# Patient Record
(demographics unavailable — no encounter records)

---

## 2025-03-18 NOTE — DATA REVIEWED
[FreeTextEntry1] : office xrays of right hip ap and lateral show mild progression of lateral hip oa and mixed type hip impingement.

## 2025-03-18 NOTE — PHYSICAL EXAM
[FreeTextEntry1] : CAMDEN is a 74 year old female  Constitutional: healthy appearing, NAD, and normal body habitus  LUMBAR  ROM: flexion to 30 deg, ext to 5 deg  Gait: normal  Inspection: no erythema, warmth Spine: no TTP in spinous process Bony palpation: no TTP in GT  Soft tissue palpation hip: no TTP in gluteus pierre Soft tissue palpation of spine:  TTP in lumbar paraspinals   5/5 bilateral KE, DF, PF and right HF sensation intact in bilat LE  Special tests: neg seated SLR +FADIR in right hip  shoulders: abd to 180 deg bilaterally

## 2025-03-18 NOTE — ASSESSMENT
[FreeTextEntry1] : lumbar - Discussed diagnosis and treatment plan including PT. Not taking chinese herbs  Ice area often. Told to be careful with NSAIDs given age. Watch out for GI bleeding, blood in stool, and stomach irritation.  shoulder - Do shoulder stretches often get back to RTC exercises  hip - has mod/severe hip OA consider hip injection avoid using high bath tub   f/u 4 wk

## 2025-03-18 NOTE — HISTORY OF PRESENT ILLNESS
[FreeTextEntry1] : Location: back Severity: 5/10 worse after moving boxes from low cabinet Duration: few months; hx LBP 2 years ago Context: atraumatic Aggravating Factors: sitting, stairs for hip Alleviating Factors: injections Associated Symptoms: denies fever, chills, change in bowel/bladder habits, weakness, numbness/tingling, + radiation down right leg, denies weight loss.  Right hip hurting after getting into high bath tub.   Prior Studies: xray; MRI 2022 9/2022 right hip injection - 30% relief 11/2022 right L2 and S1 RITU  Shoulders are doing well. Pain is 2/10. Pain with sleeping on shoulder.  Chi jessie helps. 10/2022 right subacromial injection 4/2023 right subacromial injection 11/2023 Left C4/5 and C5/6 Facet Injections

## 2025-03-18 NOTE — PROCEDURE
[de-identified] : Indication: myofascial pain   After informed consent, she elected to proceed with a trigger point injection into the left L4 and L5 paraspinals, trapezius. I confirmed no prior adverse reactions, no active infections, and no relevant allergies.   The skin was prepped in the usual sterile manner. The muscles were pinched and elevated from the chest wall to avoid puncturing the lung. The sites were injected with local anesthetic followed by local needling. The injection was completed without complication and a bandage was applied. She tolerated the procedure well and was given post-injection instructions.   Cold Tx x 48 hours, analgesics prn. Medications: 0.5 ml of 1% Lidocaine per site       Exp 7/2025 Manufacture: [employer]DC [id number] LOT [id number]

## 2025-05-15 NOTE — ASSESSMENT
[FreeTextEntry1] : lumbar - Discussed diagnosis and treatment plan including PT. doing qi gong Ice area often. taught calf exercises be careful moving  shoulder - Do shoulder stretches often do RTC exercises once a wk  f/u prn

## 2025-05-15 NOTE — HISTORY OF PRESENT ILLNESS
[FreeTextEntry1] : Location: back Severity: 2/10 Duration: few months; hx LBP 2 years ago Context: atraumatic Aggravating Factors: sitting, stairs for hip Alleviating Factors: injections Associated Symptoms: denies fever, chills, change in bowel/bladder habits, weakness, numbness/tingling, + radiation down right leg, denies weight loss.  Right hip hurting after getting into high bath tub.   Prior Studies: xray; MRI 2022 9/2022 right hip injection - 30% relief 11/2022 right L2 and S1 RITU  Shoulders are doing well. No pain. Pain with sleeping on shoulder.  Chi gong helps. 10/2022 right subacromial injection 4/2023 right subacromial injection 11/2023 Left C4/5 and C5/6 Facet Injections

## 2025-05-15 NOTE — PROCEDURE
[de-identified] : Indication: myofascial pain   After informed consent, she elected to proceed with a trigger point injection into the left L4 and L5 paraspinals, trapezius. I confirmed no prior adverse reactions, no active infections, and no relevant allergies.   The skin was prepped in the usual sterile manner. The muscles were pinched and elevated from the chest wall to avoid puncturing the lung. The sites were injected with local anesthetic followed by local needling. The injection was completed without complication and a bandage was applied. She tolerated the procedure well and was given post-injection instructions.   Cold Tx x 48 hours, analgesics prn. Medications: 0.5 ml of 1% Lidocaine per site       Exp 7/2025 Manufacture: [employer] NDC [id number] LOT [id number]